# Patient Record
Sex: FEMALE | Race: OTHER | HISPANIC OR LATINO | URBAN - NONMETROPOLITAN AREA
[De-identification: names, ages, dates, MRNs, and addresses within clinical notes are randomized per-mention and may not be internally consistent; named-entity substitution may affect disease eponyms.]

---

## 2024-01-10 ENCOUNTER — OFFICE VISIT (OUTPATIENT)
Dept: URGENT CARE | Facility: PHYSICIAN GROUP | Age: 39
End: 2024-01-10
Payer: OTHER GOVERNMENT

## 2024-01-10 VITALS
BODY MASS INDEX: 36.32 KG/M2 | SYSTOLIC BLOOD PRESSURE: 130 MMHG | DIASTOLIC BLOOD PRESSURE: 88 MMHG | OXYGEN SATURATION: 96 % | HEIGHT: 63 IN | TEMPERATURE: 97.6 F | HEART RATE: 99 BPM | WEIGHT: 205 LBS | RESPIRATION RATE: 16 BRPM

## 2024-01-10 DIAGNOSIS — J45.21 MILD INTERMITTENT ASTHMA WITH ACUTE EXACERBATION: ICD-10-CM

## 2024-01-10 DIAGNOSIS — U07.1 COVID-19: ICD-10-CM

## 2024-01-10 DIAGNOSIS — R50.9 FEVER, UNSPECIFIED FEVER CAUSE: ICD-10-CM

## 2024-01-10 LAB
FLUAV RNA SPEC QL NAA+PROBE: NEGATIVE
FLUBV RNA SPEC QL NAA+PROBE: NEGATIVE
RSV RNA SPEC QL NAA+PROBE: NEGATIVE
SARS-COV-2 RNA RESP QL NAA+PROBE: POSITIVE

## 2024-01-10 PROCEDURE — 3075F SYST BP GE 130 - 139MM HG: CPT | Performed by: NURSE PRACTITIONER

## 2024-01-10 PROCEDURE — 99203 OFFICE O/P NEW LOW 30 MIN: CPT | Performed by: NURSE PRACTITIONER

## 2024-01-10 PROCEDURE — 3079F DIAST BP 80-89 MM HG: CPT | Performed by: NURSE PRACTITIONER

## 2024-01-10 PROCEDURE — 0241U POCT CEPHEID COV-2, FLU A/B, RSV - PCR: CPT | Performed by: NURSE PRACTITIONER

## 2024-01-10 RX ORDER — DEXTROMETHORPHAN HYDROBROMIDE AND PROMETHAZINE HYDROCHLORIDE 15; 6.25 MG/5ML; MG/5ML
5 SYRUP ORAL EVERY 6 HOURS PRN
Qty: 100 ML | Refills: 0 | Status: SHIPPED | OUTPATIENT
Start: 2024-01-10

## 2024-01-10 RX ORDER — ALBUTEROL SULFATE 90 UG/1
2 AEROSOL, METERED RESPIRATORY (INHALATION) EVERY 6 HOURS PRN
Qty: 8.5 G | Refills: 1 | Status: SHIPPED | OUTPATIENT
Start: 2024-01-10

## 2024-01-10 NOTE — LETTER
[facility] [place] [address]     January 10, 2024    Patient: Lisandra Le   YOB: 1985   Date of Visit: 1/10/2024       To Whom It May Concern:    To Whom it may Concern,    Today your employee tested positive for COVID-19.       They can leave their home on  01/15/2024 (5 days after symptoms started) if their symptoms are improving and have been fever free for 24 hours without the use of fever-reducing medication.    Wear a high-quality mask around other people and attend work through  01/19/2024 (Full 10 days of quarantine).    If their symptoms are improving, have been fever-free for 24 hours, without the use of fever-reducing medication, and they have access to home antigen tests, they can consider using them after their isolation (5-day ivette). With 2 sequential negative tests 48 hours apart, they may remove their mask sooner than (10-day ivette). If their antigen test results are positive, they may still be infectious and should not remove their mask around others.   Continue taking antigen tests at least 48 hours apart until they have 2 sequential negative results. This may mean they will need to continue wearing a mask and testing beyond day 10.    [facility] does not retest patients or provide further work notes.  This is the only note that will be provided from the [facility] for this visit. Your employee will require an appointment with a primary care provider if FMLA or disability forms are required.      Sincerely,     TERA Ahuja.

## 2024-01-10 NOTE — PROGRESS NOTES
"Subjective:   Lisandra Le is a 38 y.o. female who presents for Fever (Fever, body aches, chills, fatigue, sore throat. Runny nose. Dizziness. )    Patient is a 38-year-old female presenting clinic today with 1 day history of fevers, chills, and sweats, fatigue, generalized body aches, sinus pain and pressure, runny nose, and dizziness.  Denies any cough, sore throat, rashes, or abdominal symptoms.  She does have mild exacerbation of asthma symptoms with increased wheezing and slight shortness of breath.  Patient is requesting refill of her albuterol.  Denies any nocturnal awakening of symptoms.  Has been taking over-the-counter DayQuil, Excedrin, and TheraFlu with minimal symptom relief.  Patient states that her symptoms are better today compared to yesterday.    Medications, Allergies, and current problem list reviewed today in Epic.     Objective:     /88   Pulse 99   Temp 36.4 °C (97.6 °F) (Temporal)   Resp 16   Ht 1.6 m (5' 3\")   Wt 93 kg (205 lb)   SpO2 96%     Physical Exam  Vitals reviewed.   Constitutional:       General: She is not in acute distress.     Appearance: Normal appearance. She is ill-appearing. She is not toxic-appearing.   HENT:      Head: Normocephalic.      Right Ear: Tympanic membrane, ear canal and external ear normal.      Left Ear: Tympanic membrane, ear canal and external ear normal.      Nose: Rhinorrhea present.      Mouth/Throat:      Mouth: Mucous membranes are moist.      Pharynx: No posterior oropharyngeal erythema.   Eyes:      Extraocular Movements: Extraocular movements intact.      Conjunctiva/sclera: Conjunctivae normal.      Pupils: Pupils are equal, round, and reactive to light.   Cardiovascular:      Rate and Rhythm: Normal rate and regular rhythm.   Pulmonary:      Effort: Pulmonary effort is normal. No respiratory distress.      Breath sounds: Normal breath sounds. No stridor. No wheezing, rhonchi or rales.   Musculoskeletal:         General: Normal range " of motion.      Cervical back: Normal range of motion and neck supple.   Lymphadenopathy:      Cervical: No cervical adenopathy.   Skin:     General: Skin is warm and dry.   Neurological:      Mental Status: She is alert and oriented to person, place, and time.   Psychiatric:         Mood and Affect: Mood normal.         Behavior: Behavior normal.         Thought Content: Thought content normal.         Judgment: Judgment normal.         Assessment/Plan:     Diagnosis and associated orders:     1. COVID-19  Nirmatrelvir&Ritonavir 300/100 20 x 150 MG & 10 x 100MG Tablet Therapy Pack      2. Fever, unspecified fever cause  POCT Cepheid CoV-2, Flu A/B, RSV - PCR      3. Mild intermittent asthma with acute exacerbation  albuterol 108 (90 Base) MCG/ACT Aero Soln inhalation aerosol         Comments/MDM:     POCT COVID test positive.  Patient today presents to clinic with mild symptoms.  She does report mild exacerbation of asthma symptoms at home.  Lung sounds are clear on physical exam.  Vital signs are all within normal range.  At length discussion with patient regards to side effects medication.  Patient does meet criteria for paxlovid.  Patient does understand the risk associated with starting Paxlovid. Patient denies any history of decreased renal function or renal impairment.  Prescription was sent to requested pharmacy.  OTC Tylenol or Motrin for fever/discomfort.  OTC cough/cold medication for symptomatic relief  OTC Supportive Care for Congestion - saline nasal spray or neti pot  Drink plenty of fluids  Advised the patient to stay at home under self-isolation according to CDC guidelines.  Provided the patient with home isolation and self quarantine instructions  Work  note provided  Follow-up with PCP  Return to clinic or go to the ED if symptoms worsen or fail to improve, or if the patient should develop worsening/increasing cough, congestion, ear pain, sore throat, shortness of breath, wheezing, chest pain,  fever/chills, and/or any concerning symptoms.  Patient was involved with shared decision-making throughout the exam today and verbalizes understanding regards to plan of care, discharge instructions, and follow-up         Differential diagnosis, natural history, supportive care, and indications for immediate follow-up discussed.    Advised the patient to follow-up with the primary care physician for recheck, reevaluation, and consideration of further management.    I personally reviewed prior external notes and test results pertinent to today's visit as well as additional imaging and testing completed in clinic today.     Please note that this dictation was created using voice recognition software. I have made a reasonable attempt to correct obvious errors, but I expect that there are errors of grammar and possibly content that I did not discover before finalizing the note.

## 2024-09-30 ENCOUNTER — APPOINTMENT (OUTPATIENT)
Dept: MEDICAL GROUP | Facility: IMAGING CENTER | Age: 39
End: 2024-09-30
Payer: OTHER GOVERNMENT

## 2024-12-23 ENCOUNTER — APPOINTMENT (OUTPATIENT)
Dept: URGENT CARE | Facility: PHYSICIAN GROUP | Age: 39
End: 2024-12-23
Payer: OTHER GOVERNMENT

## 2025-02-21 ENCOUNTER — HOSPITAL ENCOUNTER (OUTPATIENT)
Facility: MEDICAL CENTER | Age: 40
End: 2025-02-21
Payer: OTHER GOVERNMENT

## 2025-02-21 ENCOUNTER — OFFICE VISIT (OUTPATIENT)
Dept: OBGYN | Facility: CLINIC | Age: 40
End: 2025-02-21
Payer: OTHER GOVERNMENT

## 2025-02-21 VITALS
DIASTOLIC BLOOD PRESSURE: 88 MMHG | HEIGHT: 63 IN | BODY MASS INDEX: 35.44 KG/M2 | SYSTOLIC BLOOD PRESSURE: 142 MMHG | WEIGHT: 200 LBS | HEART RATE: 83 BPM

## 2025-02-21 DIAGNOSIS — N76.0 ACUTE VAGINITIS: ICD-10-CM

## 2025-02-21 DIAGNOSIS — Z11.3 ROUTINE SCREENING FOR STI (SEXUALLY TRANSMITTED INFECTION): ICD-10-CM

## 2025-02-21 DIAGNOSIS — Z12.4 SCREENING FOR CERVICAL CANCER: ICD-10-CM

## 2025-02-21 DIAGNOSIS — Z01.419 WELL WOMAN EXAM: ICD-10-CM

## 2025-02-21 DIAGNOSIS — Z12.31 ENCOUNTER FOR SCREENING MAMMOGRAM FOR BREAST CANCER: ICD-10-CM

## 2025-02-21 PROCEDURE — 87510 GARDNER VAG DNA DIR PROBE: CPT

## 2025-02-21 PROCEDURE — 88142 CYTOPATH C/V THIN LAYER: CPT

## 2025-02-21 PROCEDURE — 87624 HPV HI-RISK TYP POOLED RSLT: CPT

## 2025-02-21 PROCEDURE — 87591 N.GONORRHOEAE DNA AMP PROB: CPT

## 2025-02-21 PROCEDURE — 87480 CANDIDA DNA DIR PROBE: CPT

## 2025-02-21 PROCEDURE — 87660 TRICHOMONAS VAGIN DIR PROBE: CPT

## 2025-02-21 PROCEDURE — 87491 CHLMYD TRACH DNA AMP PROBE: CPT

## 2025-02-21 NOTE — PROGRESS NOTES
Patient here for annual well woman exam  Patient reports regular cycles  LMP: 2/6/2025  Last Pap/Results: 10/2020 - Normal  Patient has history of abnormal pap in 2018  Last Mammogram: 2015   Sexually Active: Yes - one male  Birth Control Method: BTL  Phone: [phone]  [facility]: [facility]

## 2025-02-21 NOTE — PROGRESS NOTES
ANNUAL GYNECOLOGY VISIT    Chief Complaint  Annual    Subjective  Lisandra Le is a 39 y.o. female  Patient's last menstrual period was 2025 (exact date). using BTL  for contraception who presents today for Annual Exam.    She has noticed some vaginal odor during intercourse within the last 2 weeks.     Left breast had a rash but it is going away.     Preventive Care     There is no immunization history on file for this patient.    Guardasil HPV vaccine: does not have     Gynecology History and ROS  Current Sexual Activity: yes - with one male partner   History of sexually transmitted diseases? no  Abnormal discharge? no  Current Contraception: BTL     Menstrual History  Patient's last menstrual period was 2025 (exact date).  Periods are regular  q 28 days, lasting 4 days.   Clots or heavy flow: yes - lots of clotting   Dysmenorrhea: no  Intermenstrual bleeding/spotting: no  Significant pain with periods:no  Bothersome PMS symptoms: no  Significant Pelvic Pain: no    Pap History  Last pap smear:  negative   History of moderate or severe dysplasia: yes - in 2018 but doesn't know what it was. Pap in  was normal.     Cancer Risk Assessement:  Family history of:   - Breast cancer: paternal aunts   - Ovarian cancer: no   - Uterine cancer: no   - Colon cancer: no    Obstetric History  OB History    Para Term  AB Living   3        SAB IAB Ectopic Molar Multiple Live Births              # Outcome Date GA Lbr Kenny/2nd Weight Sex Type Anes PTL Lv   3             2             1                 Past Medical History  Past Medical History:   Diagnosis Date    Allergy     Asthma        Past Surgical History  Past Surgical History:   Procedure Laterality Date    LUMPECTOMY      TUBAL COAGULATION LAPAROSCOPIC BILATERAL         Social History  Social History     Tobacco Use    Smoking status: Never    Smokeless tobacco: Never   Vaping Use    Vaping status: Never  "Used   Substance Use Topics    Alcohol use: Not Currently    Drug use: Yes     Types: Marijuana, Oral     Comment: Gummies        Family History  Family History   Problem Relation Age of Onset    Bipolar disorder Mother     Schizophrenia Mother     Breast Cancer Paternal Aunt     Cancer Maternal Grandfather        Home Medications  Current Outpatient Medications   Medication Sig    albuterol 108 (90 Base) MCG/ACT Aero Soln inhalation aerosol Inhale 2 Puffs every 6 hours as needed for Shortness of Breath.    Nirmatrelvir&Ritonavir 300/100 20 x 150 MG & 10 x 100MG Tablet Therapy Pack Take 300 mg nirmatrelvir (two 150 mg tablets) with 100 mg ritonavir (one 100 mg tablet) by mouth, with all three tablets taken together twice daily for 5 days. (Patient not taking: Reported on 2/21/2025)    promethazine-dextromethorphan (PROMETHAZINE-DM) 6.25-15 MG/5ML syrup Take 5 mL by mouth every 6 hours as needed for Cough for up to 20 doses. (Patient not taking: Reported on 2/21/2025)       Allergies/Reactions  No Known Allergies    ROS  Positive ROS: denies  Gen: no fevers or chills, no significant weight loss or gain, excessive fatigue  Respiratory:  no cough or dyspnea  Cardiac:  no chest pain, no palpitations, no syncope  Breast: no breast discharge, pain, lump or skin changes  GI:  no heartburn, no abdominal pain, no nausea or vomiting  Urinary: no dysuria, urgency, frequency, incontinence   Psych: no depression or anxiety  Neuro: no migraines with aura, fainting spells, numbness or tingling  Extremities: no joint pain, persistently swollen ankles, recurrent leg cramps      Physical Examination:  Vital Signs: BP (!) 142/88 (BP Location: Left arm, Patient Position: Sitting, BP Cuff Size: Large adult)   Pulse 83   Ht 5' 3\"   Wt 200 lb   LMP 02/06/2025 (Exact Date)   BMI 35.43 kg/m²       Constitutional: The patient is well developed and well nourished.  Psychiatric: Patient is oriented to time place and person.   Skin: No " rash observed.  Neck: Appears symmetric. Thyroid normal size  Respiratory: normal effort  Breast: Inspection reveals no asymetry or nipple discharge, no skin thickening, dimpling or erythema.  Palpation demonstrates no masses.  Abdomen: Soft, non-tender.  Pelvic Exam:      Vulva: external female genitalia are normal in appearance. No lesions     Urethra - no lesions, no erythema     Vagina: moist, pink, normal ruggae     Cervix: pink, smooth, no lesions, no CMT     Uterus - non-tender, normal size, shape, contour, mobile, anteverted     Ovaries: non-tender, no appreciable masses    Pap Smear performed: Yes    Chaperone Present: Marilou Young MA   Extremeties: Legs are symmetric and without tenderness. There is no edema present.        Assessment & Plan  Lisandra Le is a 39 y.o. female who presents today for Annual Gyn Exam.     1. Well woman exam  Anticipatory guidance given. Encouraged adequate water intake, healthy diet, regular exercise. Educated on Pap smear screening and guidelines for age per ACOG and ASSCP. Discussed safe sex, STI prevention, contraception/family planning. Self breast exam taught.     - THINPREP PAP W/HPV AND CTNG; collected  - VAGINAL PATHOGENS DNA PANEL; collected  - Referral to Genetic Research Studies    2. Screening for cervical cancer    - THINPREP PAP W/HPV AND CTNG; collected    3. Routine screening for STI (sexually transmitted infection)    - THINPREP PAP W/HPV AND CTNG; collected    4. Encounter for screening mammogram for breast cancer    - MA-SCREENING MAMMO BILAT W/ IMPLANTS W/CAD; Future    5. Acute vaginitis    - VAGINAL PATHOGENS DNA PANEL; collected              Return: Annually or PRN    LAKESHA Swann  [facility]

## 2025-02-22 LAB
C TRACH DNA GENITAL QL NAA+PROBE: NEGATIVE
CANDIDA DNA VAG QL PROBE+SIG AMP: NEGATIVE
G VAGINALIS DNA VAG QL PROBE+SIG AMP: POSITIVE
N GONORRHOEA DNA GENITAL QL NAA+PROBE: NEGATIVE
SPECIMEN SOURCE: NORMAL
T VAGINALIS DNA VAG QL PROBE+SIG AMP: NEGATIVE

## 2025-02-24 ENCOUNTER — RESEARCH ENCOUNTER (OUTPATIENT)
Dept: RESEARCH | Facility: MEDICAL CENTER | Age: 40
End: 2025-02-24
Payer: OTHER GOVERNMENT

## 2025-02-24 ENCOUNTER — RESULTS FOLLOW-UP (OUTPATIENT)
Dept: OBGYN | Facility: CLINIC | Age: 40
End: 2025-02-24

## 2025-02-24 DIAGNOSIS — N76.0 BACTERIAL VAGINOSIS: ICD-10-CM

## 2025-02-24 DIAGNOSIS — B96.89 BACTERIAL VAGINOSIS: ICD-10-CM

## 2025-02-24 RX ORDER — METRONIDAZOLE 500 MG/1
500 TABLET ORAL 2 TIMES DAILY
Qty: 14 TABLET | Refills: 0 | Status: SHIPPED | OUTPATIENT
Start: 2025-02-24 | End: 2025-03-03

## 2025-02-24 NOTE — RESEARCH NOTE
Patient has been referred by TERA Swann. The initial referral follow-up message, which includes the consent form link, has been sent to the patient.    Eligible Studies: HNP

## 2025-02-25 ENCOUNTER — RESEARCH ENCOUNTER (OUTPATIENT)
Dept: RESEARCH | Facility: MEDICAL CENTER | Age: 40
End: 2025-02-25
Payer: OTHER GOVERNMENT

## 2025-02-25 DIAGNOSIS — Z00.6 CLINICAL TRIAL PARTICIPANT: ICD-10-CM

## 2025-02-26 LAB
HPV I/H RISK 1 DNA SPEC QL NAA+PROBE: NOT DETECTED
SPECIMEN SOURCE: NORMAL
THINPREP PAP, CYTOLOGY NL11781: NORMAL